# Patient Record
Sex: FEMALE | HISPANIC OR LATINO | URBAN - METROPOLITAN AREA
[De-identification: names, ages, dates, MRNs, and addresses within clinical notes are randomized per-mention and may not be internally consistent; named-entity substitution may affect disease eponyms.]

---

## 2019-07-23 ENCOUNTER — INPATIENT (INPATIENT)
Facility: HOSPITAL | Age: 79
LOS: 0 days | Discharge: HOSPICE | End: 2019-07-24
Attending: INTERNAL MEDICINE | Admitting: INTERNAL MEDICINE
Payer: MEDICARE

## 2019-07-23 VITALS
OXYGEN SATURATION: 99 % | TEMPERATURE: 99 F | SYSTOLIC BLOOD PRESSURE: 128 MMHG | HEART RATE: 91 BPM | WEIGHT: 128.97 LBS | HEIGHT: 66 IN | DIASTOLIC BLOOD PRESSURE: 75 MMHG | RESPIRATION RATE: 20 BRPM

## 2019-07-23 DIAGNOSIS — Z90.89 ACQUIRED ABSENCE OF OTHER ORGANS: Chronic | ICD-10-CM

## 2019-07-23 DIAGNOSIS — Z98.890 OTHER SPECIFIED POSTPROCEDURAL STATES: Chronic | ICD-10-CM

## 2019-07-23 DIAGNOSIS — Z95.0 PRESENCE OF CARDIAC PACEMAKER: Chronic | ICD-10-CM

## 2019-07-23 LAB
ALBUMIN SERPL ELPH-MCNC: 2.7 G/DL — LOW (ref 3.3–5)
ALP SERPL-CCNC: 130 U/L — HIGH (ref 40–120)
ALT FLD-CCNC: 85 U/L — HIGH (ref 12–78)
ANION GAP SERPL CALC-SCNC: 8 MMOL/L — SIGNIFICANT CHANGE UP (ref 5–17)
APPEARANCE UR: ABNORMAL
APTT BLD: 26.9 SEC — LOW (ref 27.5–36.3)
AST SERPL-CCNC: 50 U/L — HIGH (ref 15–37)
BACTERIA # UR AUTO: ABNORMAL
BASOPHILS # BLD AUTO: 0.04 K/UL — SIGNIFICANT CHANGE UP (ref 0–0.2)
BASOPHILS NFR BLD AUTO: 0.4 % — SIGNIFICANT CHANGE UP (ref 0–2)
BILIRUB SERPL-MCNC: 0.5 MG/DL — SIGNIFICANT CHANGE UP (ref 0.2–1.2)
BILIRUB UR-MCNC: NEGATIVE — SIGNIFICANT CHANGE UP
BUN SERPL-MCNC: 17 MG/DL — SIGNIFICANT CHANGE UP (ref 7–23)
CALCIUM SERPL-MCNC: 9.4 MG/DL — SIGNIFICANT CHANGE UP (ref 8.5–10.1)
CHLORIDE SERPL-SCNC: 107 MMOL/L — SIGNIFICANT CHANGE UP (ref 96–108)
CO2 SERPL-SCNC: 27 MMOL/L — SIGNIFICANT CHANGE UP (ref 22–31)
COLOR SPEC: YELLOW — SIGNIFICANT CHANGE UP
COMMENT - URINE: SIGNIFICANT CHANGE UP
CREAT SERPL-MCNC: 0.56 MG/DL — SIGNIFICANT CHANGE UP (ref 0.5–1.3)
DIFF PNL FLD: ABNORMAL
EOSINOPHIL # BLD AUTO: 0.07 K/UL — SIGNIFICANT CHANGE UP (ref 0–0.5)
EOSINOPHIL NFR BLD AUTO: 0.6 % — SIGNIFICANT CHANGE UP (ref 0–6)
EPI CELLS # UR: SIGNIFICANT CHANGE UP
GLUCOSE SERPL-MCNC: 110 MG/DL — HIGH (ref 70–99)
GLUCOSE UR QL: NEGATIVE MG/DL — SIGNIFICANT CHANGE UP
HCT VFR BLD CALC: 38.4 % — SIGNIFICANT CHANGE UP (ref 34.5–45)
HGB BLD-MCNC: 12.8 G/DL — SIGNIFICANT CHANGE UP (ref 11.5–15.5)
IMM GRANULOCYTES NFR BLD AUTO: 0.6 % — SIGNIFICANT CHANGE UP (ref 0–1.5)
INR BLD: 1.43 RATIO — HIGH (ref 0.88–1.16)
KETONES UR-MCNC: NEGATIVE — SIGNIFICANT CHANGE UP
LACTATE SERPL-SCNC: 1.9 MMOL/L — SIGNIFICANT CHANGE UP (ref 0.7–2)
LEUKOCYTE ESTERASE UR-ACNC: ABNORMAL
LYMPHOCYTES # BLD AUTO: 1.44 K/UL — SIGNIFICANT CHANGE UP (ref 1–3.3)
LYMPHOCYTES # BLD AUTO: 12.7 % — LOW (ref 13–44)
MCHC RBC-ENTMCNC: 31.2 PG — SIGNIFICANT CHANGE UP (ref 27–34)
MCHC RBC-ENTMCNC: 33.3 GM/DL — SIGNIFICANT CHANGE UP (ref 32–36)
MCV RBC AUTO: 93.7 FL — SIGNIFICANT CHANGE UP (ref 80–100)
MONOCYTES # BLD AUTO: 0.92 K/UL — HIGH (ref 0–0.9)
MONOCYTES NFR BLD AUTO: 8.1 % — SIGNIFICANT CHANGE UP (ref 2–14)
NEUTROPHILS # BLD AUTO: 8.83 K/UL — HIGH (ref 1.8–7.4)
NEUTROPHILS NFR BLD AUTO: 77.6 % — HIGH (ref 43–77)
NITRITE UR-MCNC: NEGATIVE — SIGNIFICANT CHANGE UP
PH UR: 5 — SIGNIFICANT CHANGE UP (ref 5–8)
PLATELET # BLD AUTO: 269 K/UL — SIGNIFICANT CHANGE UP (ref 150–400)
POTASSIUM SERPL-MCNC: 4.4 MMOL/L — SIGNIFICANT CHANGE UP (ref 3.5–5.3)
POTASSIUM SERPL-SCNC: 4.4 MMOL/L — SIGNIFICANT CHANGE UP (ref 3.5–5.3)
PROT SERPL-MCNC: 8 GM/DL — SIGNIFICANT CHANGE UP (ref 6–8.3)
PROT UR-MCNC: 30 MG/DL
PROTHROM AB SERPL-ACNC: 16.1 SEC — HIGH (ref 10–12.9)
RBC # BLD: 4.1 M/UL — SIGNIFICANT CHANGE UP (ref 3.8–5.2)
RBC # FLD: 14.1 % — SIGNIFICANT CHANGE UP (ref 10.3–14.5)
RBC CASTS # UR COMP ASSIST: SIGNIFICANT CHANGE UP /HPF (ref 0–4)
SODIUM SERPL-SCNC: 142 MMOL/L — SIGNIFICANT CHANGE UP (ref 135–145)
SP GR SPEC: 1.02 — SIGNIFICANT CHANGE UP (ref 1.01–1.02)
UROBILINOGEN FLD QL: NEGATIVE MG/DL — SIGNIFICANT CHANGE UP
WBC # BLD: 11.37 K/UL — HIGH (ref 3.8–10.5)
WBC # FLD AUTO: 11.37 K/UL — HIGH (ref 3.8–10.5)
WBC UR QL: SIGNIFICANT CHANGE UP

## 2019-07-23 PROCEDURE — 99285 EMERGENCY DEPT VISIT HI MDM: CPT

## 2019-07-23 PROCEDURE — 93010 ELECTROCARDIOGRAM REPORT: CPT

## 2019-07-23 PROCEDURE — 71045 X-RAY EXAM CHEST 1 VIEW: CPT | Mod: 26

## 2019-07-23 RX ORDER — ONDANSETRON 8 MG/1
4 TABLET, FILM COATED ORAL EVERY 4 HOURS
Refills: 0 | Status: DISCONTINUED | OUTPATIENT
Start: 2019-07-23 | End: 2019-07-24

## 2019-07-23 RX ORDER — MORPHINE SULFATE 50 MG/1
1 CAPSULE, EXTENDED RELEASE ORAL EVERY 6 HOURS
Refills: 0 | Status: DISCONTINUED | OUTPATIENT
Start: 2019-07-23 | End: 2019-07-24

## 2019-07-23 RX ORDER — IPRATROPIUM/ALBUTEROL SULFATE 18-103MCG
3 AEROSOL WITH ADAPTER (GRAM) INHALATION EVERY 6 HOURS
Refills: 0 | Status: DISCONTINUED | OUTPATIENT
Start: 2019-07-23 | End: 2019-07-24

## 2019-07-23 RX ORDER — MORPHINE SULFATE 50 MG/1
2 CAPSULE, EXTENDED RELEASE ORAL ONCE
Refills: 0 | Status: DISCONTINUED | OUTPATIENT
Start: 2019-07-23 | End: 2019-07-23

## 2019-07-23 RX ORDER — ACETAMINOPHEN 500 MG
1 TABLET ORAL
Qty: 0 | Refills: 0 | DISCHARGE

## 2019-07-23 RX ORDER — AZTREONAM 2 G
1000 VIAL (EA) INJECTION ONCE
Refills: 0 | Status: COMPLETED | OUTPATIENT
Start: 2019-07-23 | End: 2019-07-23

## 2019-07-23 RX ORDER — VANCOMYCIN HCL 1 G
1000 VIAL (EA) INTRAVENOUS ONCE
Refills: 0 | Status: COMPLETED | OUTPATIENT
Start: 2019-07-23 | End: 2019-07-23

## 2019-07-23 RX ORDER — ROBINUL 0.2 MG/ML
0.4 INJECTION INTRAMUSCULAR; INTRAVENOUS
Refills: 0 | Status: DISCONTINUED | OUTPATIENT
Start: 2019-07-23 | End: 2019-07-24

## 2019-07-23 RX ORDER — CIPROFLOXACIN LACTATE 400MG/40ML
1 VIAL (ML) INTRAVENOUS
Qty: 0 | Refills: 0 | DISCHARGE

## 2019-07-23 RX ORDER — SCOPALAMINE 1 MG/3D
1 PATCH, EXTENDED RELEASE TRANSDERMAL
Refills: 0 | Status: DISCONTINUED | OUTPATIENT
Start: 2019-07-23 | End: 2019-07-24

## 2019-07-23 RX ORDER — SODIUM CHLORIDE 9 MG/ML
1800 INJECTION, SOLUTION INTRAVENOUS ONCE
Refills: 0 | Status: COMPLETED | OUTPATIENT
Start: 2019-07-23 | End: 2019-07-23

## 2019-07-23 RX ORDER — ACETAMINOPHEN 500 MG
650 TABLET ORAL ONCE
Refills: 0 | Status: COMPLETED | OUTPATIENT
Start: 2019-07-23 | End: 2019-07-23

## 2019-07-23 RX ORDER — SODIUM CHLORIDE 9 MG/ML
1000 INJECTION, SOLUTION INTRAVENOUS
Refills: 0 | Status: DISCONTINUED | OUTPATIENT
Start: 2019-07-23 | End: 2019-07-24

## 2019-07-23 RX ORDER — ACETAMINOPHEN 500 MG
1000 TABLET ORAL ONCE
Refills: 0 | Status: COMPLETED | OUTPATIENT
Start: 2019-07-23 | End: 2019-07-23

## 2019-07-23 RX ORDER — ACETAMINOPHEN 500 MG
650 TABLET ORAL EVERY 6 HOURS
Refills: 0 | Status: DISCONTINUED | OUTPATIENT
Start: 2019-07-23 | End: 2019-07-24

## 2019-07-23 RX ORDER — ONDANSETRON 8 MG/1
4 TABLET, FILM COATED ORAL ONCE
Refills: 0 | Status: COMPLETED | OUTPATIENT
Start: 2019-07-23 | End: 2019-07-23

## 2019-07-23 RX ADMIN — SODIUM CHLORIDE 1800 MILLILITER(S): 9 INJECTION, SOLUTION INTRAVENOUS at 15:45

## 2019-07-23 RX ADMIN — SODIUM CHLORIDE 1800 MILLILITER(S): 9 INJECTION, SOLUTION INTRAVENOUS at 16:45

## 2019-07-23 RX ADMIN — SCOPALAMINE 1 PATCH: 1 PATCH, EXTENDED RELEASE TRANSDERMAL at 20:37

## 2019-07-23 RX ADMIN — Medication 1000 MILLIGRAM(S): at 16:44

## 2019-07-23 RX ADMIN — Medication 250 MILLIGRAM(S): at 16:05

## 2019-07-23 RX ADMIN — ONDANSETRON 4 MILLIGRAM(S): 8 TABLET, FILM COATED ORAL at 16:06

## 2019-07-23 RX ADMIN — MORPHINE SULFATE 2 MILLIGRAM(S): 50 CAPSULE, EXTENDED RELEASE ORAL at 16:05

## 2019-07-23 RX ADMIN — SODIUM CHLORIDE 100 MILLILITER(S): 9 INJECTION, SOLUTION INTRAVENOUS at 21:59

## 2019-07-23 RX ADMIN — Medication 1000 MILLIGRAM(S): at 17:05

## 2019-07-23 RX ADMIN — Medication 50 MILLIGRAM(S): at 15:44

## 2019-07-23 RX ADMIN — Medication 400 MILLIGRAM(S): at 20:51

## 2019-07-23 RX ADMIN — Medication 100 MILLIGRAM(S): at 22:53

## 2019-07-23 NOTE — H&P ADULT - NSHPSOCIALHISTORY_GEN_ALL_CORE
FAMILY HX continued, parents both  , unknown medical problems    lives at NH  non verbal  bed ridden

## 2019-07-23 NOTE — H&P ADULT - NSICDXPASTSURGICALHX_GEN_ALL_CORE_FT
PAST SURGICAL HISTORY:  Pacemaker PAST SURGICAL HISTORY:  H/O vein stripping     Pacemaker     S/P tonsillectomy

## 2019-07-23 NOTE — ED ADULT NURSE NOTE - OBJECTIVE STATEMENT
Pt from [facility] for fever since July 15th.  Daughter states pt has been taking antibiotics with no relief r/t elevated WBC.  PT noted to have cough.  Pt nonverbal at baseline, daughter stating "I think her throat hurts her".  Vitals as noted, will continue to monitor

## 2019-07-23 NOTE — ED ADULT NURSE NOTE - CHIEF COMPLAINT QUOTE
patient brought in by EMS from SNF for fever since 7/15. patient has been on antibiotics x12 days for elevated WBC as per EMS. patient has wet nonproductive cough noted. patient was started on 2L NC at SNF for SpO2 90% on RA. color good. primary RN aware of need for rectal temp. rectal temp prior to arrival was 100.8F rectally.

## 2019-07-23 NOTE — H&P ADULT - HISTORY OF PRESENT ILLNESS
Pt is a 79 yo female with a pmh/o HTN and atrial fibrillation s/p AICD/PPM, who was sent from NH due to persistent fevers and copious secretions. Daughter at bedside states that she has been treated for two weeks now on levaquin and continues to have fevers and cough. Daughter states patient is bedridden, non verbal, 'forgets' to swallow when being fed, and believes that she is repeatedly aspirating as they continue to try to feed her at NH with good intention. Daughter states pt is DNR/DNI and that she is realistic with her prognosis. Daughter d Pt is a 79 yo female with a pmh/o HTN and atrial fibrillation s/p AICD/PPM, who was sent from NH due to persistent fevers and copious secretions. Daughter at bedside states that she has been treated for two weeks now on levaquin and continues to have fevers and cough. Daughter states patient is bedridden, non verbal, 'forgets' to swallow when being fed, and believes that she is repeatedly aspirating as they continue to try to feed her at NH with good intention. Daughter states pt is DNR/DNI and that she is realistic with her prognosis.

## 2019-07-23 NOTE — PHARMACOTHERAPY INTERVENTION NOTE - COMMENTS
Medication history completed, verified with [facility] medication list and doctor first.

## 2019-07-23 NOTE — ED ADULT TRIAGE NOTE - CHIEF COMPLAINT QUOTE
patient brought in by EMS from SNF for fever since 7/15. patient has been on antibiotics x12 days for elevated WBC as per EMS. patient has wet nonproductive cough noted. patient was started on 2L NC at SNF for SpO2 90% on RA. color good. primary RN aware of need for rectal temp. rectal temp prior to arrival was 100.8F rectally. conducted a detailed discussion... I had a detailed discussion with the patient and/or guardian regarding the historical points, exam findings, and any diagnostic results supporting the discharge/admit diagnosis.

## 2019-07-23 NOTE — H&P ADULT - ATTENDING COMMENTS
20 min spent discussing GOC and advanced care planning with Daughter (who is legal proxy along with her brother) who states pt is DNR. MOLST form discussed and options reviewed. Family has decided on : no pressors, no PEG, no trach, no TPN, no comfort feeds. Copy of MOLST placed in paper chart and pt made DNR/DNI with above modifications.

## 2019-07-23 NOTE — ED PROVIDER NOTE - OBJECTIVE STATEMENT
77 y/o F with PMHx of pacemaker, PVD, dementia, HTN, frequent falls presenting to the ED BIBEMS from NH for fever since 7/15/2019. Per daughter, pt has been on abx x12 days for elevated WBC. +nonproductive cough. Daughter at bedside states that pt has been "very stiff" and has been groaning in pain. Full history is unobtainable due to pt's dementia. Partial history obtained from daughter at bedside. Pt is DNR.

## 2019-07-23 NOTE — H&P ADULT - NSICDXPASTMEDICALHX_GEN_ALL_CORE_FT
PAST MEDICAL HISTORY:  Dementia     HTN (hypertension)     Pacemaker     PVD (peripheral vascular disease)

## 2019-07-24 ENCOUNTER — TRANSCRIPTION ENCOUNTER (OUTPATIENT)
Age: 79
End: 2019-07-24

## 2019-07-24 VITALS — WEIGHT: 128.75 LBS

## 2019-07-24 LAB
ALBUMIN SERPL ELPH-MCNC: 2.4 G/DL — LOW (ref 3.3–5)
ALP SERPL-CCNC: 103 U/L — SIGNIFICANT CHANGE UP (ref 40–120)
ALT FLD-CCNC: 61 U/L — SIGNIFICANT CHANGE UP (ref 12–78)
ANION GAP SERPL CALC-SCNC: 7 MMOL/L — SIGNIFICANT CHANGE UP (ref 5–17)
AST SERPL-CCNC: 28 U/L — SIGNIFICANT CHANGE UP (ref 15–37)
BASOPHILS # BLD AUTO: 0.05 K/UL — SIGNIFICANT CHANGE UP (ref 0–0.2)
BASOPHILS NFR BLD AUTO: 0.5 % — SIGNIFICANT CHANGE UP (ref 0–2)
BILIRUB SERPL-MCNC: 0.5 MG/DL — SIGNIFICANT CHANGE UP (ref 0.2–1.2)
BUN SERPL-MCNC: 11 MG/DL — SIGNIFICANT CHANGE UP (ref 7–23)
CALCIUM SERPL-MCNC: 8.7 MG/DL — SIGNIFICANT CHANGE UP (ref 8.5–10.1)
CHLORIDE SERPL-SCNC: 111 MMOL/L — HIGH (ref 96–108)
CO2 SERPL-SCNC: 25 MMOL/L — SIGNIFICANT CHANGE UP (ref 22–31)
CREAT SERPL-MCNC: 0.39 MG/DL — LOW (ref 0.5–1.3)
EOSINOPHIL # BLD AUTO: 0.14 K/UL — SIGNIFICANT CHANGE UP (ref 0–0.5)
EOSINOPHIL NFR BLD AUTO: 1.5 % — SIGNIFICANT CHANGE UP (ref 0–6)
GLUCOSE SERPL-MCNC: 110 MG/DL — HIGH (ref 70–99)
HAV IGM SER-ACNC: SIGNIFICANT CHANGE UP
HBV CORE IGM SER-ACNC: SIGNIFICANT CHANGE UP
HBV SURFACE AG SER-ACNC: SIGNIFICANT CHANGE UP
HCT VFR BLD CALC: 33.4 % — LOW (ref 34.5–45)
HCV AB S/CO SERPL IA: 0.19 S/CO — SIGNIFICANT CHANGE UP (ref 0–0.99)
HCV AB SERPL-IMP: SIGNIFICANT CHANGE UP
HGB BLD-MCNC: 11.3 G/DL — LOW (ref 11.5–15.5)
IMM GRANULOCYTES NFR BLD AUTO: 0.6 % — SIGNIFICANT CHANGE UP (ref 0–1.5)
LYMPHOCYTES # BLD AUTO: 1.24 K/UL — SIGNIFICANT CHANGE UP (ref 1–3.3)
LYMPHOCYTES # BLD AUTO: 13.1 % — SIGNIFICANT CHANGE UP (ref 13–44)
MCHC RBC-ENTMCNC: 31.2 PG — SIGNIFICANT CHANGE UP (ref 27–34)
MCHC RBC-ENTMCNC: 33.8 GM/DL — SIGNIFICANT CHANGE UP (ref 32–36)
MCV RBC AUTO: 92.3 FL — SIGNIFICANT CHANGE UP (ref 80–100)
MONOCYTES # BLD AUTO: 0.89 K/UL — SIGNIFICANT CHANGE UP (ref 0–0.9)
MONOCYTES NFR BLD AUTO: 9.4 % — SIGNIFICANT CHANGE UP (ref 2–14)
NEUTROPHILS # BLD AUTO: 7.12 K/UL — SIGNIFICANT CHANGE UP (ref 1.8–7.4)
NEUTROPHILS NFR BLD AUTO: 74.9 % — SIGNIFICANT CHANGE UP (ref 43–77)
PLATELET # BLD AUTO: 227 K/UL — SIGNIFICANT CHANGE UP (ref 150–400)
POTASSIUM SERPL-MCNC: 3.4 MMOL/L — LOW (ref 3.5–5.3)
POTASSIUM SERPL-SCNC: 3.4 MMOL/L — LOW (ref 3.5–5.3)
PROT SERPL-MCNC: 6.8 GM/DL — SIGNIFICANT CHANGE UP (ref 6–8.3)
RBC # BLD: 3.62 M/UL — LOW (ref 3.8–5.2)
RBC # FLD: 13.9 % — SIGNIFICANT CHANGE UP (ref 10.3–14.5)
SODIUM SERPL-SCNC: 143 MMOL/L — SIGNIFICANT CHANGE UP (ref 135–145)
WBC # BLD: 9.5 K/UL — SIGNIFICANT CHANGE UP (ref 3.8–10.5)
WBC # FLD AUTO: 9.5 K/UL — SIGNIFICANT CHANGE UP (ref 3.8–10.5)

## 2019-07-24 RX ORDER — CEFEPIME 1 G/1
INJECTION, POWDER, FOR SOLUTION INTRAMUSCULAR; INTRAVENOUS
Refills: 0 | Status: DISCONTINUED | OUTPATIENT
Start: 2019-07-24 | End: 2019-07-24

## 2019-07-24 RX ORDER — ROBINUL 0.2 MG/ML
0.2 INJECTION INTRAMUSCULAR; INTRAVENOUS EVERY 6 HOURS
Refills: 0 | Status: DISCONTINUED | OUTPATIENT
Start: 2019-07-24 | End: 2019-07-24

## 2019-07-24 RX ORDER — SCOPALAMINE 1 MG/3D
1 PATCH, EXTENDED RELEASE TRANSDERMAL
Qty: 0 | Refills: 0 | DISCHARGE
Start: 2019-07-24

## 2019-07-24 RX ORDER — ONDANSETRON 8 MG/1
4 TABLET, FILM COATED ORAL
Qty: 0 | Refills: 0 | DISCHARGE
Start: 2019-07-24

## 2019-07-24 RX ORDER — IPRATROPIUM/ALBUTEROL SULFATE 18-103MCG
3 AEROSOL WITH ADAPTER (GRAM) INHALATION
Qty: 0 | Refills: 0 | DISCHARGE
Start: 2019-07-24

## 2019-07-24 RX ORDER — MORPHINE SULFATE 50 MG/1
3 CAPSULE, EXTENDED RELEASE ORAL
Refills: 0 | Status: DISCONTINUED | OUTPATIENT
Start: 2019-07-24 | End: 2019-07-24

## 2019-07-24 RX ORDER — SENNA PLUS 8.6 MG/1
2 TABLET ORAL
Qty: 0 | Refills: 0 | DISCHARGE

## 2019-07-24 RX ORDER — LANOLIN ALCOHOL/MO/W.PET/CERES
1 CREAM (GRAM) TOPICAL
Qty: 0 | Refills: 0 | DISCHARGE

## 2019-07-24 RX ORDER — MORPHINE SULFATE 50 MG/1
3 CAPSULE, EXTENDED RELEASE ORAL
Qty: 0 | Refills: 0 | DISCHARGE
Start: 2019-07-24

## 2019-07-24 RX ORDER — AMLODIPINE BESYLATE 2.5 MG/1
1 TABLET ORAL
Qty: 0 | Refills: 0 | DISCHARGE

## 2019-07-24 RX ORDER — ALBUTEROL 90 UG/1
3 AEROSOL, METERED ORAL
Qty: 0 | Refills: 0 | DISCHARGE

## 2019-07-24 RX ORDER — ROBINUL 0.2 MG/ML
0.2 INJECTION INTRAMUSCULAR; INTRAVENOUS
Qty: 0 | Refills: 0 | DISCHARGE
Start: 2019-07-24

## 2019-07-24 RX ADMIN — SCOPALAMINE 1 PATCH: 1 PATCH, EXTENDED RELEASE TRANSDERMAL at 07:03

## 2019-07-24 RX ADMIN — MORPHINE SULFATE 1 MILLIGRAM(S): 50 CAPSULE, EXTENDED RELEASE ORAL at 06:58

## 2019-07-24 RX ADMIN — Medication 100 MILLIGRAM(S): at 05:12

## 2019-07-24 RX ADMIN — MORPHINE SULFATE 3 MILLIGRAM(S): 50 CAPSULE, EXTENDED RELEASE ORAL at 17:10

## 2019-07-24 RX ADMIN — Medication 3 MILLILITER(S): at 01:02

## 2019-07-24 RX ADMIN — MORPHINE SULFATE 3 MILLIGRAM(S): 50 CAPSULE, EXTENDED RELEASE ORAL at 13:46

## 2019-07-24 RX ADMIN — Medication 3 MILLILITER(S): at 08:30

## 2019-07-24 RX ADMIN — Medication 3 MILLILITER(S): at 14:05

## 2019-07-24 NOTE — DIETITIAN INITIAL EVALUATION ADULT. - OTHER INFO
79yo female admitted from NH w/ aspiration PNA. Non verbal and unable to provide any information due to dx of Dementia. As per H & P pt forgot how to swallow. +Edentulous noted. +DNR/DNI with poor prognosis. Pending Palliative care consult for GOC and expansion of advance directives. Pending SLP for appropriate consistency diet advancement. Total assistance with meals due to cognitive status. D5 infusing for hydration. Will continue to monitor for diet advancement .  Maintain aspiration precautions.

## 2019-07-24 NOTE — DISCHARGE NOTE NURSING/CASE MANAGEMENT/SOCIAL WORK - NSDCDPATPORTLINK_GEN_ALL_CORE
You can access the [facility] Portal, offered by [facility], by registering with the following website: [email or web address]

## 2019-07-24 NOTE — DIETITIAN INITIAL EVALUATION ADULT. - PERTINENT LABORATORY DATA
07-24 Na143 mmol/L Glu 110 mg/dL<H> K+ 3.4 mmol/L<L> Cr  0.39 mg/dL<L> BUN 11 mg/dL Phos n/a   Alb 2.4 g/dL<L> PAB n/a

## 2019-07-24 NOTE — DIETITIAN INITIAL EVALUATION ADULT. - MALNUTRITION
severe malnutrition Pt meets criteria for severe protein/calorie malnutrition in context of acute illness secondary to moderate fat/muscle wasting.

## 2019-07-24 NOTE — CONSULT NOTE ADULT - ASSESSMENT
HPI: Pt is a 78y old Female with a PMHx of HTN and atrial fibrillation s/p AICD/PPM, who was sent from NH due to persistent fevers and copious secretions. Daughter at bedside states that she has been treated for two weeks now on Levaquin and continues to have fevers and cough. Daughter states patient is bedridden, non verbal, 'forgets' to swallow when being fed, and believes that she is repeatedly aspirating as they continue to try to feed her at NH with good intention. Daughter states pt is DNR/DNI and that she is realistic with her prognosis. Palliative medicine Consult to further establish GOC  7/23/19 Seen and examined at bedside with no family present. Eyes open to voice. Non verbal.     Assessment and Plan:    1) Advanced Dementia  -FAST 7C  -Aspiration risk  -Dysphagia  -Supportive care    2) Dysphagia  -NPO   -Speech and swallow eval pending    3) Acute on chronic resp failure  -R/T chronic aspiration  -Abx as per ID  -Family declines FT  -Supplemental O2 PRN  -Cont Robinul 0.2 mg IVP Q6H PRN sevretions  -Increase Morphine to 2 mg IVP Q3H PRN severe pain/dyspnea    4) Advanced Directives  -Pt without capacity  -Daughter Andannika surrogate  -MOLST on chart 7/23  DNR/DNI/LMT/NFT/TrialIV/Useabx  -[facility] discussion today at 1130 am HPI: Pt is a 78y old Female with a PMHx of HTN and atrial fibrillation s/p  PPM, who was sent from NH due to persistent fevers and copious secretions. Daughter at bedside states that she has been treated for two weeks now on Levaquin and continues to have fevers and cough. Daughter states patient is bedridden, non verbal, 'forgets' to swallow when being fed, and believes that she is repeatedly aspirating as they continue to try to feed her at NH with good intention. Daughter states pt is DNR/DNI and that she is realistic with her prognosis. Palliative medicine Consult to further establish GOC  7/23/19 Seen and examined at bedside with no family present. Eyes open to voice. Non verbal.     Assessment and Plan:    1) Advanced Dementia  -FAST 7C  -Aspiration risk  -Dysphagia  -Supportive care    2) Dysphagia  -NPO   -Speech and swallow eval pending    3) Acute on chronic resp failure  -R/T chronic aspiration  -Abx as per ID  -Family declines FT  -Supplemental O2 PRN  -Cont Robinul 0.2 mg IVP Q6H PRN sevretions  -Increase Morphine to 2 mg IVP Q3H PRN severe pain/dyspnea    4) Advanced Directives  -Pt without capacity  -Daughter Andaluz surrogate  -MOLST on chart 7/23  DNR/DNI/LMT/NFT/TrialIV/Useabx  -[facility] discussion today at 1130 am  -Verification with EP services that pt does NOT have an AICD. PPM only

## 2019-07-24 NOTE — SWALLOW BEDSIDE ASSESSMENT ADULT - COMMENTS
pt, white female, resident of [facility], age 78 with a pmh/o HTN and atrial fibrillation s/p AICD/PPM, who was sent from NH due to persistent fevers and copious secretions. Daughter at bedside states that she has been treated for two weeks now on Levaquin and continues to have fevers and cough. Daughter states patient is bedridden, non verbal, 'forgets' to swallow when being fed, and believes that she is repeatedly aspirating as they continue to try to feed her at NH with good intention. Daughter states pt is DNR/DNI and that she is realistic with her prognosis.   Service is asked to evaluate the pt for capacity for po intake.    Chart reviewed.
(3) adequate

## 2019-07-24 NOTE — DIETITIAN INITIAL EVALUATION ADULT. - PERTINENT MEDS FT
MEDICATIONS  (STANDING):  ALBUTerol/ipratropium for Nebulization 3 milliLiter(s) Nebulizer every 6 hours  glycopyrrolate Injectable 0.2 milliGRAM(s) IV Push every 6 hours  morphine  - Injectable 3 milliGRAM(s) IV Push every 3 hours  scopolamine   Patch 1 Patch Transdermal every 72 hours    MEDICATIONS  (PRN):  acetaminophen  Suppository .. 650 milliGRAM(s) Rectal every 6 hours PRN Temp greater or equal to 38C (100.4F)  ondansetron Injectable 4 milliGRAM(s) IV Push every 4 hours PRN Nausea and/or Vomiting

## 2019-07-24 NOTE — SWALLOW BEDSIDE ASSESSMENT ADULT - ORAL PHASE
initial AP movement: slow and not well organized.  No oral repsosne on TrIal 3 with bolus absorbed by oral mucus membrane.

## 2019-07-24 NOTE — CONSULT NOTE ADULT - SUBJECTIVE AND OBJECTIVE BOX
HPI: Pt is a 78y old Female with a PMHx of HTN and atrial fibrillation s/p AICD/PPM, who was sent from NH due to persistent fevers and copious secretions. Daughter at bedside states that she has been treated for two weeks now on Levaquin and continues to have fevers and cough. Daughter states patient is bedridden, non verbal, 'forgets' to swallow when being fed, and believes that she is repeatedly aspirating as they continue to try to feed her at NH with good intention. Daughter states pt is DNR/DNI and that she is realistic with her prognosis. Palliative medicine Consult to further establish GOC  7/23/19 Seen and examined at bedside with no family present. Eyes open to voice. Non verbal.     PAIN: ( )Yes   (X)No    DYSPNEA: ( ) Yes  (X ) No  Level:    PAST MEDICAL & SURGICAL HISTORY:  Pacemaker  PVD (peripheral vascular disease)  HTN (hypertension)  Dementia  H/O vein stripping  S/P tonsillectomy  Pacemaker      SOCIAL HX:  Lives in SNF  Hx opiate tolerance ( )YES  (X )NO    Baseline ADLs  (Prior to Admission)  ( ) Independent   (X )Dependent    FAMILY HISTORY:  FH: hypertension: in daughter      Review of Systems:      All other systems reviewed and negative  Unable to obtain/Limited due to:      PHYSICAL EXAM:    Vital Signs Last 24 Hrs  T(C): 37.3 (24 Jul 2019 04:50), Max: 38.7 (23 Jul 2019 20:39)  T(F): 99.1 (24 Jul 2019 04:50), Max: 101.7 (23 Jul 2019 20:39)  HR: 60 (24 Jul 2019 08:31) (60 - 91)  BP: 140/64 (24 Jul 2019 06:53) (116/61 - 153/81)  RR: 18 (24 Jul 2019 04:50) (16 - 20)  SpO2: 100% (24 Jul 2019 04:50) (96% - 100%)  Daily Height in cm: 167.64 (23 Jul 2019 15:00)        PPSV2: 10-20 %  FAST: 7C    General: Elderly female in bed in NAD  Mental Status: Eyes open/Non verbal  HEENT: Oral mucosa dry  Lungs: clear to auscultation  art  Cardiac: S1S2+  GI: Abd soft NT ND + BS  : Abd soft NT + BS  Ext: no edema/ BUE contractures  Neuro: minimal response      LABS:                        11.3   9.50  )-----------( 227      ( 24 Jul 2019 06:42 )             33.4     07-24    143  |  111<H>  |  11  ----------------------------<  110<H>  3.4<L>   |  25  |  0.39<L>    Ca    8.7      24 Jul 2019 06:42    TPro  6.8  /  Alb  2.4<L>  /  TBili  0.5  /  DBili  x   /  AST  28  /  ALT  61  /  AlkPhos  103  07-24    PT/INR - ( 23 Jul 2019 15:26 )   PT: 16.1 sec;   INR: 1.43 ratio         PTT - ( 23 Jul 2019 15:26 )  PTT:26.9 sec  Albumin: Albumin, Serum: 2.4 g/dL (07-24 @ 06:42)      Allergies    iodine (Unknown)  penicillin (Anaphylaxis)  shellfish (Unknown)    Intolerances      MEDICATIONS  (STANDING):  ALBUTerol/ipratropium for Nebulization 3 milliLiter(s) Nebulizer every 6 hours  clindamycin IVPB 600 milliGRAM(s) IV Intermittent every 8 hours  dextrose 5% + sodium chloride 0.9%. 1000 milliLiter(s) (100 mL/Hr) IV Continuous <Continuous>  scopolamine   Patch 1 Patch Transdermal every 72 hours    MEDICATIONS  (PRN):  acetaminophen  Suppository .. 650 milliGRAM(s) Rectal every 6 hours PRN Temp greater or equal to 38C (100.4F)  artificial  tears Solution 2 Drop(s) Both EYES every 4 hours PRN Dry Eyes  glycopyrrolate Injectable 0.4 milliGRAM(s) IV Push four times a day PRN Secretions  morphine  - Injectable 1 milliGRAM(s) IV Push every 6 hours PRN Severe Pain (7 - 10)  ondansetron Injectable 4 milliGRAM(s) IV Push every 4 hours PRN Nausea and/or Vomiting      RADIOLOGY/ADDITIONAL STUDIES:  < from: Xray Chest 1 View-PORTABLE IMMEDIATE (07.23.19 @ 16:41) >    EXAM:  XR CHEST PORTABLE IMMED 1V                            PROCEDURE DATE:  07/23/2019          INTERPRETATION:  CLINICAL STATEMENT:   Sepsis     TECHNIQUE: Single AP radiograph of the chest. XR CHEST IMMEDIATE.    COMPARISON: 10/4/2016    FINDINGS:  The cardiomediastinal silhouette is normal in size. There is   calcification of the aortic arch. Low inspiratory volumes exaggerate the   pulmonary markings. Dual-lead pacemaker.    No focal infiltrate, vascular congestion, pleural effusion, or   pneumothorax.    Degenerative changes of the spine.    IMPRESSION:    No evidence of acute cardiopulmonary disease. HPI: Pt is a 78y old Female with a PMHx of HTN and atrial fibrillation s/p PPM, who was sent from NH due to persistent fevers and copious secretions. Daughter at bedside states that she has been treated for two weeks now on Levaquin and continues to have fevers and cough. Daughter states patient is bedridden, non verbal, 'forgets' to swallow when being fed, and believes that she is repeatedly aspirating as they continue to try to feed her at NH with good intention. Daughter states pt is DNR/DNI and that she is realistic with her prognosis. Palliative medicine Consult to further establish GOC  7/23/19 Seen and examined at bedside with no family present. Eyes open to voice. Non verbal.     PAIN: ( )Yes   (X)No    DYSPNEA: ( ) Yes  (X ) No  Level:    PAST MEDICAL & SURGICAL HISTORY:  Pacemaker  PVD (peripheral vascular disease)  HTN (hypertension)  Dementia  H/O vein stripping  S/P tonsillectomy  Pacemaker      SOCIAL HX:  Lives in SNF  Hx opiate tolerance ( )YES  (X )NO    Baseline ADLs  (Prior to Admission)  ( ) Independent   (X )Dependent    FAMILY HISTORY:  FH: hypertension: in daughter      Review of Systems:      All other systems reviewed and negative  Unable to obtain/Limited due to:      PHYSICAL EXAM:    Vital Signs Last 24 Hrs  T(C): 37.3 (24 Jul 2019 04:50), Max: 38.7 (23 Jul 2019 20:39)  T(F): 99.1 (24 Jul 2019 04:50), Max: 101.7 (23 Jul 2019 20:39)  HR: 60 (24 Jul 2019 08:31) (60 - 91)  BP: 140/64 (24 Jul 2019 06:53) (116/61 - 153/81)  RR: 18 (24 Jul 2019 04:50) (16 - 20)  SpO2: 100% (24 Jul 2019 04:50) (96% - 100%)  Daily Height in cm: 167.64 (23 Jul 2019 15:00)        PPSV2: 10-20 %  FAST: 7C    General: Elderly female in bed in NAD  Mental Status: Eyes open/Non verbal  HEENT: Oral mucosa dry  Lungs: clear to auscultation  art  Cardiac: S1S2+  GI: Abd soft NT ND + BS  : Abd soft NT + BS  Ext: no edema/ BUE contractures  Neuro: minimal response      LABS:                        11.3   9.50  )-----------( 227      ( 24 Jul 2019 06:42 )             33.4     07-24    143  |  111<H>  |  11  ----------------------------<  110<H>  3.4<L>   |  25  |  0.39<L>    Ca    8.7      24 Jul 2019 06:42    TPro  6.8  /  Alb  2.4<L>  /  TBili  0.5  /  DBili  x   /  AST  28  /  ALT  61  /  AlkPhos  103  07-24    PT/INR - ( 23 Jul 2019 15:26 )   PT: 16.1 sec;   INR: 1.43 ratio         PTT - ( 23 Jul 2019 15:26 )  PTT:26.9 sec  Albumin: Albumin, Serum: 2.4 g/dL (07-24 @ 06:42)      Allergies    iodine (Unknown)  penicillin (Anaphylaxis)  shellfish (Unknown)    Intolerances      MEDICATIONS  (STANDING):  ALBUTerol/ipratropium for Nebulization 3 milliLiter(s) Nebulizer every 6 hours  clindamycin IVPB 600 milliGRAM(s) IV Intermittent every 8 hours  dextrose 5% + sodium chloride 0.9%. 1000 milliLiter(s) (100 mL/Hr) IV Continuous <Continuous>  scopolamine   Patch 1 Patch Transdermal every 72 hours    MEDICATIONS  (PRN):  acetaminophen  Suppository .. 650 milliGRAM(s) Rectal every 6 hours PRN Temp greater or equal to 38C (100.4F)  artificial  tears Solution 2 Drop(s) Both EYES every 4 hours PRN Dry Eyes  glycopyrrolate Injectable 0.4 milliGRAM(s) IV Push four times a day PRN Secretions  morphine  - Injectable 1 milliGRAM(s) IV Push every 6 hours PRN Severe Pain (7 - 10)  ondansetron Injectable 4 milliGRAM(s) IV Push every 4 hours PRN Nausea and/or Vomiting      RADIOLOGY/ADDITIONAL STUDIES:  < from: Xray Chest 1 View-PORTABLE IMMEDIATE (07.23.19 @ 16:41) >    EXAM:  XR CHEST PORTABLE IMMED 1V                            PROCEDURE DATE:  07/23/2019          INTERPRETATION:  CLINICAL STATEMENT:   Sepsis     TECHNIQUE: Single AP radiograph of the chest. XR CHEST IMMEDIATE.    COMPARISON: 10/4/2016    FINDINGS:  The cardiomediastinal silhouette is normal in size. There is   calcification of the aortic arch. Low inspiratory volumes exaggerate the   pulmonary markings. Dual-lead pacemaker.    No focal infiltrate, vascular congestion, pleural effusion, or   pneumothorax.    Degenerative changes of the spine.    IMPRESSION:    No evidence of acute cardiopulmonary disease.

## 2019-07-24 NOTE — CHART NOTE - NSCHARTNOTEFT_GEN_A_CORE
Upon Nutritional Assessment by the Registered Dietitian your patient was determined to meet criteria / has evidence of the following diagnosis/diagnoses:          [ ]  Mild Protein Calorie Malnutrition        [ ]  Moderate Protein Calorie Malnutrition        [x ] Severe Protein Calorie Malnutrition        [ ] Unspecified Protein Calorie Malnutrition        [ ] Underweight / BMI <19        [ ] Morbid Obesity / BMI > 40      Findings as based on:  •  Comprehensive nutrition assessment and consultation  •  Calorie counts (nutrient intake analysis)  •  Food acceptance and intake status from observations by staff  •  Follow up  •  Patient education  •  Intervention secondary to interdisciplinary rounds  •   concerns  ******Pt meets criteria for severe protein/calorie malnutrition in context of acute illness secondary to moderate fat/muscle wasting.       Treatment:      1) Advance po diet when feasible for QOL feeds.   2) PO supplements when diet advanced Gelatein plus Jello po BID. Ensure enlive 8oz po BID   3) Nutrition support is not recommended due to overall declining medical status which evidenced based studies indicate EN is not effective in prolonging survival and improving quality of life. It can also increase risk of aspiration pneumonia as well as other related issues.   4) Maintain aspiration precautions, back of bed >35 degrees.           PROVIDER Section:     By signing this assessment you are acknowledging and agree with the diagnosis/diagnoses assigned by the Registered Dietitian    Comments:

## 2019-07-24 NOTE — DIETITIAN INITIAL EVALUATION ADULT. - ADD RECOMMEND
1) Advance po diet when feasible for QOL feeds. 2) PO supplements when diet advanced Gelatein plus Jello po BID. Ensure enlive 8oz po BID 3) Nutrition support is not recommended due to overall declining medical status which evidenced based studies indicate EN is not effective in prolonging survival and improving quality of life. It can also increase risk of aspiration pneumonia as well as other related issues. 4) Maintain aspiration precautions, back of bed >35 degrees.

## 2019-07-24 NOTE — DIETITIAN INITIAL EVALUATION ADULT. - ENERGY NEEDS
Ht.  66    "        Wt. 128.9   #              BMI  20.8                   #               Pt is at 91   %  IBW

## 2019-07-24 NOTE — SWALLOW BEDSIDE ASSESSMENT ADULT - SLP GENERAL OBSERVATIONS
pt semi upright in bed, eyes closed. lethargic and only slowly and briefly rousable to touch.  On eye opening, eyeballs rolled up in sockets.  head turn to right preference..

## 2019-07-24 NOTE — GOALS OF CARE CONVERSATION - PERSONAL ADVANCE DIRECTIVE - CONVERSATION DETAILS
Team met with pts family to discuss goals of care assist with planning and provide supportive counseling. Pt. is a LT resident at [facility]. Pts family reports that pt. is bed bound and total care and has been for a few years. The also discussed pts dementia and its progression.    Pts current medical condition and goals of care discussed. Pts daughters discussed how pts comfort is most important to them. Pt. is receiving IV medications for symptom management. We discussed option of a comfort focus and inpatient hospice based on pts needs. Pts family is in agreement with this plan and would like to focus on comfort and have pt. referred to [facility].     We reviewed MOLST form that was on chart. No changes made (MOLST states DNR/DNI, Limited Medical, No feeding tube, trial IV fluids, Use antibiotics) pts family is aware there will be strict comfort focus at inpatient hospice and are in agreement with this. They are also aware IV antibiotics are not done at [facility].     Plan at this time if for referral to [facility] for inpatient hospice. Floor SW Hood made aware and will place referral. Emotional support provided. Our team will continue to follow.
The role of palliative medicine was reviewed. the pt's daughter discussed her mother's recent decline and long course of progressive dementia. She is bedbound and unable to speak. Her quality of life has diminishd greatly over the past few years. She most recently has been febrile with a persistent cough, and unmanageable secretions with repeated antibiotic trials. They now choose a comfort care focus with symptom management. We discussed hospice and they request a referral with [facility]. I discussed terminating her AICD as per chart however verifiyed with the EP service she does not have an AICD. 46 minutes spent discussing advanced care planning and MOLST review

## 2019-07-24 NOTE — CONSULT NOTE ADULT - ASSESSMENT
79 yo female with a pmh/o HTN and atrial fibrillation s/p AICD/PPM, who was sent from NH due to persistent fevers and copious secretions. Daughter at bedside states that she has been treated for two weeks now on levaquin and continues to have fevers and cough. Daughter states patient is bedridden, non verbal, 'forgets' to swallow when being fed, and believes that she is repeatedly aspirating as they continue to try to feed her at NH with good intention.     1. fever. leukocytosis. aspiration pneumonitis  - xray not impressive but suspect aspiration  - pcn allergy not true allergy  - given clinda/vanco/aztreonam  - can give cefepime   - if plan for comfort care/hospice would suggest monitor off abx  - f/u cultures  - supportive care    2. other issues - care per medicine

## 2019-07-24 NOTE — SWALLOW BEDSIDE ASSESSMENT ADULT - SWALLOW EVAL: DIAGNOSIS
onsistenct preservation of underlying phayrngeal swallow, but lack of oral awareness for consistent and safe ingestion po 2/2 advancing dementia.   pt cannot eat by mouth safely or sufficiently.   Keep NPO.  Mouth care for oral comfort and hygiene.

## 2019-07-24 NOTE — GOALS OF CARE CONVERSATION - PERSONAL ADVANCE DIRECTIVE - NS PRO AD PATIENT TYPE
Do Not Resuscitate (DNR)/Medical Orders for Life-Sustaining Treatment (MOLST)/Health Care Proxy (HCP)
Medical Orders for Life-Sustaining Treatment (MOLST)/Health Care Proxy (HCP)/Do Not Resuscitate (DNR)

## 2019-07-24 NOTE — SWALLOW BEDSIDE ASSESSMENT ADULT - ORAL PREPARATORY PHASE
pt has no active accepting orientation to the presence of the spoon at lip, and neuromuscular facilitation techniques are used to achieve mouth opening.  Minmal ability to make lip seal on spoon, but action is mostly facilitated by clinician.   Three trials offered.

## 2019-07-24 NOTE — SWALLOW BEDSIDE ASSESSMENT ADULT - PHARYNGEAL PHASE
Slow onset of pharyngeal swallow on 2 trials but no action on trial 3.  Activation of the pharyngeal swallow is too inconssient, as is oral management for pt to take po.

## 2019-07-24 NOTE — GOALS OF CARE CONVERSATION - PERSONAL ADVANCE DIRECTIVE - NS PRO AD PATIENT TYPE ON CHART
Medical Orders for Life-Sustaining Treatment (MOLST)/Do Not Resuscitate (DNR)/Health Care Proxy (HCP)
Health Care Proxy (HCP)/Do Not Resuscitate (DNR)/Medical Orders for Life-Sustaining Treatment (MOLST)

## 2019-07-24 NOTE — DISCHARGE NOTE PROVIDER - HOSPITAL COURSE
HPI:    Pt is a 79 yo female with a pmh/o HTN and atrial fibrillation s/p AICD/PPM, who was sent from NH due to persistent fevers and copious secretions. Daughter at bedside states that she has been treated for two weeks now on levaquin and continues to have fevers and cough. Daughter states patient is bedridden, non verbal, 'forgets' to swallow when being fed, and believes that she is repeatedly aspirating as they continue to try to feed her at NH with good intention.         family decided to place pt on palliative / comfort measures                         REVIEW OF SYSTEMS:        unable to obtain, non verbal        All other review of systems is negative unless indicated above        Vital Signs Last 24 Hrs    T(C): 37 (24 Jul 2019 11:14), Max: 38.7 (23 Jul 2019 20:39)    T(F): 98.6 (24 Jul 2019 11:14), Max: 101.7 (23 Jul 2019 20:39)    HR: 62 (24 Jul 2019 14:06) (60 - 81)    BP: 137/90 (24 Jul 2019 11:14) (116/61 - 153/81)    BP(mean): --    RR: 18 (24 Jul 2019 11:14) (16 - 20)    SpO2: 100% (24 Jul 2019 11:14) (96% - 100%)        PHYSICAL EXAM:        GENERAL: NAD, Well nourished    HEENT:  NC/AT, EOMI, PERRLA, No scleral icterus, Moist mucous membranes    NECK: Supple, No JVD    CNS:  Alert & Oriented X3, Motor Strength 5/5 B/L upper and lower extremities; DTRs 2+ intact     LUNG: bilateral crackles     HEART: RRR; No murmurs, No rubs    ABDOMEN: +BS, ST/ND/NT    GENITOURINARY: Voiding, Bladder not distended    EXTREMITIES:  2+ Peripheral Pulses, No clubbing, No cyanosis, No tibial edema    MUSCULOSKELTAL: Joints normal ROM, No TTP, No effusion    VAGINAL: deferred    SKIN: no rashes    RECTAL: deferred, not indicated    BREAST: deferred                         MEDICATIONS  (STANDING):    ALBUTerol/ipratropium for Nebulization 3 milliLiter(s) Nebulizer every 6 hours    glycopyrrolate Injectable 0.2 milliGRAM(s) IV Push every 6 hours    morphine  - Injectable 3 milliGRAM(s) IV Push every 3 hours    scopolamine   Patch 1 Patch Transdermal every 72 hours        a/p:        1. sepsis and respiratory failure due to Pna    severe dysphagia         comfort care measures    hospice placement     dc to hospice, time 35min

## 2019-07-24 NOTE — SWALLOW BEDSIDE ASSESSMENT ADULT - SWALLOW EVAL: CRITERIA FOR SKILLED INTERVENTION MET
will follow, though, for family support and advice where appropriate./not appropriate for swallowing intervention

## 2019-07-24 NOTE — SWALLOW BEDSIDE ASSESSMENT ADULT - NS SPL SWALLOW CLINIC TRIAL FT
pt has some vestige of preservation for oral-pharyngeal capacity, but it is insufficient for her to take po.   Taking po serves only to increase her aspiration and choking risk.   Disc with Pt's dtr who was present:  keep NPO,  Mouth care.  pt is not in any distress, and is best served no by Comfort Care.  As per dtr, plane is for hospice, no alternative feedings .     Disc with Nsg.  service will follow for family support as necessary.

## 2019-07-24 NOTE — GOALS OF CARE CONVERSATION - PERSONAL ADVANCE DIRECTIVE - TREATMENT GUIDELINES
IV fluid trial/Comfort measures only/No artificial nutrition/DNR Order/Do not re-hospitalize/Antibiotic trial

## 2019-07-24 NOTE — CONSULT NOTE ADULT - SUBJECTIVE AND OBJECTIVE BOX
Patient is a 78y old  Female who presents with a chief complaint of Aspiration Pneumonia (2019 09:29)    HPI:  Pt is a 79 yo female with a pmh/o HTN and atrial fibrillation s/p AICD/PPM, who was sent from NH due to persistent fevers and copious secretions. Daughter at bedside states that she has been treated for two weeks now on levaquin and continues to have fevers and cough. Daughter states patient is bedridden, non verbal, 'forgets' to swallow when being fed, and believes that she is repeatedly aspirating as they continue to try to feed her at NH with good intention.       PMH: as above  PSH: as above  Meds: per reconciliation sheet, noted below  MEDICATIONS  (STANDING):  ALBUTerol/ipratropium for Nebulization 3 milliLiter(s) Nebulizer every 6 hours  glycopyrrolate Injectable 0.2 milliGRAM(s) IV Push every 6 hours  morphine  - Injectable 3 milliGRAM(s) IV Push every 3 hours  scopolamine   Patch 1 Patch Transdermal every 72 hours      Allergies    iodine (Unknown)  penicillin (Anaphylaxis)  shellfish (Unknown)    Intolerances      Social: no smoking, no alcohol, no illegal drugs; no recent travel, no exposure to TB  FAMILY HISTORY:  FH: hypertension: in daughter     no history of premature cardiovascular disease in first degree relatives    ROS: unable to obtain d/t medical condition  All other systems reviewed and are negative    Vital Signs Last 24 Hrs  T(C): 37 (2019 11:14), Max: 38.7 (2019 20:39)  T(F): 98.6 (2019 11:14), Max: 101.7 (2019 20:39)  HR: 67 (2019 11:14) (60 - 91)  BP: 137/90 (2019 11:14) (116/61 - 153/81)  BP(mean): --  RR: 18 (2019 11:14) (16 - 20)  SpO2: 100% (2019 11:14) (96% - 100%)  Daily Height in cm: 167.64 (2019 15:00)    Daily     PE:  Constitutional: frail looking  HEENT: NC/AT, EOMI, PERRLA, conjunctivae clear; ears and nose atraumatic; pharynx benign  Neck: supple; thyroid not palpable  Back: no tenderness  Respiratory: decreased breath sounds  Cardiovascular: S1S2 regular, no murmurs  Abdomen: soft, not tender, not distended, positive BS;  Genitourinary: no suprapubic tenderness  Lymphatic: no LN palpable  Musculoskeletal: no muscle tenderness, no joint swelling or tenderness  Extremities: no pedal edema  Neurological/ Psychiatric:  moving all extremities  Skin: no rashes; no palpable lesions    Labs: all available labs reviewed                        11.3   9.50  )-----------( 227      ( 2019 06:42 )             33.4         143  |  111<H>  |  11  ----------------------------<  110<H>  3.4<L>   |  25  |  0.39<L>    Ca    8.7      2019 06:42    TPro  6.8  /  Alb  2.4<L>  /  TBili  0.5  /  DBili  x   /  AST  28  /  ALT  61  /  AlkPhos  103       LIVER FUNCTIONS - ( 2019 06:42 )  Alb: 2.4 g/dL / Pro: 6.8 gm/dL / ALK PHOS: 103 U/L / ALT: 61 U/L / AST: 28 U/L / GGT: x           Urinalysis Basic - ( 2019 15:26 )    Color: Yellow / Appearance: Slightly Turbid / S.020 / pH: x  Gluc: x / Ketone: Negative  / Bili: Negative / Urobili: Negative mg/dL   Blood: x / Protein: 30 mg/dL / Nitrite: Negative   Leuk Esterase: Small / RBC: 0-2 /HPF / WBC 3-5   Sq Epi: x / Non Sq Epi: Few / Bacteria: Few      Radiology: all available radiological tests reviewed    EXAM:  XR CHEST PORTABLE IMMED 1V                            PROCEDURE DATE:  2019          INTERPRETATION:  CLINICAL STATEMENT:   Sepsis     TECHNIQUE: Single AP radiograph of the chest. XR CHEST IMMEDIATE.    COMPARISON: 10/4/2016    FINDINGS:  The cardiomediastinal silhouette is normal in size. There is   calcification of the aortic arch. Low inspiratory volumes exaggerate the   pulmonary markings. Dual-lead pacemaker.    No focal infiltrate, vascular congestion, pleural effusion, or   pneumothorax.    Degenerative changes of the spine.    IMPRESSION:    No evidence of acute cardiopulmonary disease.        Advanced directives addressed: full resuscitation

## 2019-07-25 LAB
CULTURE RESULTS: SIGNIFICANT CHANGE UP
SPECIMEN SOURCE: SIGNIFICANT CHANGE UP

## 2019-07-28 LAB
CULTURE RESULTS: SIGNIFICANT CHANGE UP
CULTURE RESULTS: SIGNIFICANT CHANGE UP
SPECIMEN SOURCE: SIGNIFICANT CHANGE UP
SPECIMEN SOURCE: SIGNIFICANT CHANGE UP

## 2019-08-01 DIAGNOSIS — Z66 DO NOT RESUSCITATE: ICD-10-CM

## 2019-08-01 DIAGNOSIS — Z91.81 HISTORY OF FALLING: ICD-10-CM

## 2019-08-01 DIAGNOSIS — I10 ESSENTIAL (PRIMARY) HYPERTENSION: ICD-10-CM

## 2019-08-01 DIAGNOSIS — I48.91 UNSPECIFIED ATRIAL FIBRILLATION: ICD-10-CM

## 2019-08-01 DIAGNOSIS — Z91.013 ALLERGY TO SEAFOOD: ICD-10-CM

## 2019-08-01 DIAGNOSIS — Z88.0 ALLERGY STATUS TO PENICILLIN: ICD-10-CM

## 2019-08-01 DIAGNOSIS — R74.0 NONSPECIFIC ELEVATION OF LEVELS OF TRANSAMINASE AND LACTIC ACID DEHYDROGENASE [LDH]: ICD-10-CM

## 2019-08-01 DIAGNOSIS — R13.10 DYSPHAGIA, UNSPECIFIED: ICD-10-CM

## 2019-08-01 DIAGNOSIS — Z51.5 ENCOUNTER FOR PALLIATIVE CARE: ICD-10-CM

## 2019-08-01 DIAGNOSIS — E43 UNSPECIFIED SEVERE PROTEIN-CALORIE MALNUTRITION: ICD-10-CM

## 2019-08-01 DIAGNOSIS — Z95.810 PRESENCE OF AUTOMATIC (IMPLANTABLE) CARDIAC DEFIBRILLATOR: ICD-10-CM
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
## 2019-08-01 DIAGNOSIS — J69.0 PNEUMONITIS DUE TO INHALATION OF FOOD AND VOMIT: ICD-10-CM

## 2019-08-01 DIAGNOSIS — I73.9 PERIPHERAL VASCULAR DISEASE, UNSPECIFIED: ICD-10-CM

## 2019-08-01 DIAGNOSIS — F03.90 UNSPECIFIED DEMENTIA WITHOUT BEHAVIORAL DISTURBANCE: ICD-10-CM

## 2019-08-01 DIAGNOSIS — R50.9 FEVER, UNSPECIFIED: ICD-10-CM

## 2019-08-01 DIAGNOSIS — J96.20 ACUTE AND CHRONIC RESPIRATORY FAILURE, UNSPECIFIED WHETHER WITH HYPOXIA OR HYPERCAPNIA: ICD-10-CM

## 2019-08-01 DIAGNOSIS — J18.9 PNEUMONIA, UNSPECIFIED ORGANISM: ICD-10-CM

## 2019-08-01 DIAGNOSIS — Z91.09 OTHER ALLERGY STATUS, OTHER THAN TO DRUGS AND BIOLOGICAL SUBSTANCES: ICD-10-CM

## 2019-08-01 DIAGNOSIS — A41.9 SEPSIS, UNSPECIFIED ORGANISM: ICD-10-CM

## 2023-10-02 NOTE — DISCHARGE NOTE PROVIDER - PROVIDER RX CONTACT NUMBER
Quality 130: Documentation Of Current Medications In The Medical Record: Current Medications Documented
Quality 431: Preventive Care And Screening: Unhealthy Alcohol Use - Screening: Patient not identified as an unhealthy alcohol user when screened for unhealthy alcohol use using a systematic screening method
Quality 226: Preventive Care And Screening: Tobacco Use: Screening And Cessation Intervention: Patient screened for tobacco use and is an ex/non-smoker
Detail Level: Detailed
[phone]

## 2023-12-20 NOTE — DISCHARGE NOTE PROVIDER - NSDCCPCAREPLAN_GEN_ALL_CORE_FT
PRINCIPAL DISCHARGE DIAGNOSIS  Diagnosis: HCAP (healthcare-associated pneumonia)  Assessment and Plan of Treatment: Yes

## 2024-10-05 NOTE — SWALLOW BEDSIDE ASSESSMENT ADULT - SWALLOW EVAL: ORAL MUSCULATURE
generally intact/Mouht care provided as pt's mouth very dry and parched:  pt  initially tolerated presence of the swab at her lips but than showed behavioral refusal: when swab was in her mouth , Pt was stimulated orally and sucked on the swab, and in addition, bite reflex was activated.
11-Sep-2024